# Patient Record
Sex: MALE | Race: WHITE | NOT HISPANIC OR LATINO | Employment: FULL TIME | URBAN - METROPOLITAN AREA
[De-identification: names, ages, dates, MRNs, and addresses within clinical notes are randomized per-mention and may not be internally consistent; named-entity substitution may affect disease eponyms.]

---

## 2023-03-01 ENCOUNTER — LAB REQUISITION (OUTPATIENT)
Dept: LAB | Facility: CLINIC | Age: 63
End: 2023-03-01
Payer: COMMERCIAL

## 2023-03-01 ENCOUNTER — TRANSFERRED RECORDS (OUTPATIENT)
Dept: HEALTH INFORMATION MANAGEMENT | Facility: CLINIC | Age: 63
End: 2023-03-01

## 2023-03-01 DIAGNOSIS — R06.02 SHORTNESS OF BREATH: ICD-10-CM

## 2023-03-01 DIAGNOSIS — R17 UNSPECIFIED JAUNDICE: ICD-10-CM

## 2023-03-01 LAB
ALBUMIN SERPL BCG-MCNC: 4.3 G/DL (ref 3.5–5.2)
ALBUMIN SERPL BCG-MCNC: 4.3 G/DL (ref 3.5–5.2)
ALP SERPL-CCNC: 57 U/L (ref 40–129)
ALP SERPL-CCNC: 57 U/L (ref 40–129)
ALT SERPL W P-5'-P-CCNC: 54 U/L (ref 10–50)
ALT SERPL W P-5'-P-CCNC: 54 U/L (ref 10–50)
ANION GAP SERPL CALCULATED.3IONS-SCNC: 16 MMOL/L (ref 7–15)
AST SERPL W P-5'-P-CCNC: 96 U/L (ref 10–50)
AST SERPL W P-5'-P-CCNC: 96 U/L (ref 10–50)
BILIRUB DIRECT SERPL-MCNC: 3.23 MG/DL (ref 0–0.3)
BILIRUB SERPL-MCNC: 7 MG/DL
BILIRUB SERPL-MCNC: 7 MG/DL
BUN SERPL-MCNC: 22.1 MG/DL (ref 8–23)
CALCIUM SERPL-MCNC: 8.4 MG/DL (ref 8.8–10.2)
CHLORIDE SERPL-SCNC: 102 MMOL/L (ref 98–107)
CREAT SERPL-MCNC: 0.77 MG/DL (ref 0.67–1.17)
DACRYOCYTES BLD QL SMEAR: SLIGHT
DEPRECATED HCO3 PLAS-SCNC: 20 MMOL/L (ref 22–29)
ERYTHROCYTE [DISTWIDTH] IN BLOOD BY AUTOMATED COUNT: 21.4 % (ref 10–15)
GFR SERPL CREATININE-BSD FRML MDRD: >90 ML/MIN/1.73M2
GLUCOSE SERPL-MCNC: 182 MG/DL (ref 70–99)
HCT VFR BLD AUTO: 16.9 % (ref 40–53)
HGB BLD-MCNC: 5.8 G/DL (ref 13.3–17.7)
MCH RBC QN AUTO: 39.7 PG (ref 26.5–33)
MCHC RBC AUTO-ENTMCNC: 34.3 G/DL (ref 31.5–36.5)
MCV RBC AUTO: 116 FL (ref 78–100)
PLAT MORPH BLD: ABNORMAL
PLATELET # BLD AUTO: 75 10E3/UL (ref 150–450)
POLYCHROMASIA BLD QL SMEAR: SLIGHT
POTASSIUM SERPL-SCNC: 3.9 MMOL/L (ref 3.4–5.3)
PROT SERPL-MCNC: 6.5 G/DL (ref 6.4–8.3)
PROT SERPL-MCNC: 6.5 G/DL (ref 6.4–8.3)
RBC # BLD AUTO: 1.46 10E6/UL (ref 4.4–5.9)
RBC MORPH BLD: ABNORMAL
SODIUM SERPL-SCNC: 138 MMOL/L (ref 136–145)
WBC # BLD AUTO: 6.2 10E3/UL (ref 4–11)

## 2023-03-01 PROCEDURE — 82248 BILIRUBIN DIRECT: CPT | Mod: ORL | Performed by: FAMILY MEDICINE

## 2023-03-01 PROCEDURE — 80053 COMPREHEN METABOLIC PANEL: CPT | Mod: ORL | Performed by: FAMILY MEDICINE

## 2023-03-01 PROCEDURE — 85027 COMPLETE CBC AUTOMATED: CPT | Mod: ORL | Performed by: FAMILY MEDICINE

## 2023-03-02 ENCOUNTER — TRANSFERRED RECORDS (OUTPATIENT)
Dept: HEALTH INFORMATION MANAGEMENT | Facility: CLINIC | Age: 63
End: 2023-03-02

## 2023-03-03 ENCOUNTER — MEDICAL CORRESPONDENCE (OUTPATIENT)
Dept: HEALTH INFORMATION MANAGEMENT | Facility: CLINIC | Age: 63
End: 2023-03-03

## 2023-03-04 ENCOUNTER — TRANSFERRED RECORDS (OUTPATIENT)
Dept: HEALTH INFORMATION MANAGEMENT | Facility: CLINIC | Age: 63
End: 2023-03-04

## 2023-03-07 ENCOUNTER — LAB REQUISITION (OUTPATIENT)
Dept: LAB | Facility: CLINIC | Age: 63
End: 2023-03-07
Payer: COMMERCIAL

## 2023-03-07 DIAGNOSIS — D51.0 VITAMIN B12 DEFICIENCY ANEMIA DUE TO INTRINSIC FACTOR DEFICIENCY: ICD-10-CM

## 2023-03-07 LAB
ALBUMIN SERPL BCG-MCNC: 3.6 G/DL (ref 3.5–5.2)
ALP SERPL-CCNC: 59 U/L (ref 40–129)
ALT SERPL W P-5'-P-CCNC: 20 U/L (ref 10–50)
ANION GAP SERPL CALCULATED.3IONS-SCNC: 12 MMOL/L (ref 7–15)
AST SERPL W P-5'-P-CCNC: 16 U/L (ref 10–50)
BILIRUB SERPL-MCNC: 2.7 MG/DL
BUN SERPL-MCNC: 15.5 MG/DL (ref 8–23)
CALCIUM SERPL-MCNC: 8.2 MG/DL (ref 8.8–10.2)
CHLORIDE SERPL-SCNC: 103 MMOL/L (ref 98–107)
CREAT SERPL-MCNC: 0.66 MG/DL (ref 0.67–1.17)
DEPRECATED HCO3 PLAS-SCNC: 24 MMOL/L (ref 22–29)
GFR SERPL CREATININE-BSD FRML MDRD: >90 ML/MIN/1.73M2
GLUCOSE SERPL-MCNC: 95 MG/DL (ref 70–99)
PHOSPHATE SERPL-MCNC: 3.5 MG/DL (ref 2.5–4.5)
POTASSIUM SERPL-SCNC: 4 MMOL/L (ref 3.4–5.3)
PROT SERPL-MCNC: 5.8 G/DL (ref 6.4–8.3)
SODIUM SERPL-SCNC: 139 MMOL/L (ref 136–145)

## 2023-03-07 PROCEDURE — 83735 ASSAY OF MAGNESIUM: CPT | Mod: ORL | Performed by: FAMILY MEDICINE

## 2023-03-07 PROCEDURE — 80053 COMPREHEN METABOLIC PANEL: CPT | Mod: ORL | Performed by: FAMILY MEDICINE

## 2023-03-07 PROCEDURE — 84100 ASSAY OF PHOSPHORUS: CPT | Mod: ORL | Performed by: FAMILY MEDICINE

## 2023-03-08 LAB — MAGNESIUM SERPL-MCNC: 4.1 MG/DL (ref 1.7–2.3)

## 2023-03-14 ENCOUNTER — LAB REQUISITION (OUTPATIENT)
Dept: LAB | Facility: CLINIC | Age: 63
End: 2023-03-14
Payer: COMMERCIAL

## 2023-03-14 DIAGNOSIS — R79.89 OTHER SPECIFIED ABNORMAL FINDINGS OF BLOOD CHEMISTRY: ICD-10-CM

## 2023-03-14 LAB — MAGNESIUM SERPL-MCNC: 2.3 MG/DL (ref 1.7–2.3)

## 2023-03-14 PROCEDURE — 83735 ASSAY OF MAGNESIUM: CPT | Mod: ORL | Performed by: FAMILY MEDICINE

## 2023-03-19 ENCOUNTER — TRANSFERRED RECORDS (OUTPATIENT)
Dept: HEALTH INFORMATION MANAGEMENT | Facility: CLINIC | Age: 63
End: 2023-03-19

## 2023-03-21 ENCOUNTER — LAB REQUISITION (OUTPATIENT)
Dept: LAB | Facility: CLINIC | Age: 63
End: 2023-03-21
Payer: COMMERCIAL

## 2023-03-21 DIAGNOSIS — M79.89 OTHER SPECIFIED SOFT TISSUE DISORDERS: ICD-10-CM

## 2023-03-21 PROCEDURE — 83880 ASSAY OF NATRIURETIC PEPTIDE: CPT | Mod: ORL | Performed by: FAMILY MEDICINE

## 2023-03-22 LAB — NT-PROBNP SERPL-MCNC: 1023 PG/ML (ref 0–900)

## 2023-04-18 ENCOUNTER — LAB REQUISITION (OUTPATIENT)
Dept: LAB | Facility: CLINIC | Age: 63
End: 2023-04-18
Payer: COMMERCIAL

## 2023-04-18 DIAGNOSIS — E80.6 OTHER DISORDERS OF BILIRUBIN METABOLISM: ICD-10-CM

## 2023-04-18 LAB
ALBUMIN SERPL BCG-MCNC: 4.6 G/DL (ref 3.5–5.2)
ALP SERPL-CCNC: 63 U/L (ref 40–129)
ALT SERPL W P-5'-P-CCNC: 13 U/L (ref 10–50)
ANION GAP SERPL CALCULATED.3IONS-SCNC: 12 MMOL/L (ref 7–15)
AST SERPL W P-5'-P-CCNC: 15 U/L (ref 10–50)
BILIRUB SERPL-MCNC: 0.5 MG/DL
BUN SERPL-MCNC: 19.3 MG/DL (ref 8–23)
CALCIUM SERPL-MCNC: 9.3 MG/DL (ref 8.8–10.2)
CHLORIDE SERPL-SCNC: 103 MMOL/L (ref 98–107)
CREAT SERPL-MCNC: 0.96 MG/DL (ref 0.67–1.17)
DEPRECATED HCO3 PLAS-SCNC: 24 MMOL/L (ref 22–29)
GFR SERPL CREATININE-BSD FRML MDRD: 90 ML/MIN/1.73M2
GLUCOSE SERPL-MCNC: 74 MG/DL (ref 70–99)
POTASSIUM SERPL-SCNC: 4.2 MMOL/L (ref 3.4–5.3)
PROT SERPL-MCNC: 7.1 G/DL (ref 6.4–8.3)
SODIUM SERPL-SCNC: 139 MMOL/L (ref 136–145)

## 2023-04-18 PROCEDURE — 80053 COMPREHEN METABOLIC PANEL: CPT | Mod: ORL | Performed by: FAMILY MEDICINE

## 2023-05-22 ENCOUNTER — OFFICE VISIT (OUTPATIENT)
Dept: CARDIOLOGY | Facility: CLINIC | Age: 63
End: 2023-05-22
Payer: COMMERCIAL

## 2023-05-22 VITALS
WEIGHT: 200 LBS | HEIGHT: 73 IN | DIASTOLIC BLOOD PRESSURE: 80 MMHG | SYSTOLIC BLOOD PRESSURE: 134 MMHG | HEART RATE: 78 BPM | BODY MASS INDEX: 26.51 KG/M2 | RESPIRATION RATE: 16 BRPM

## 2023-05-22 DIAGNOSIS — R06.09 DYSPNEA ON EXERTION: ICD-10-CM

## 2023-05-22 DIAGNOSIS — I48.0 PAROXYSMAL ATRIAL FIBRILLATION (H): Primary | ICD-10-CM

## 2023-05-22 PROCEDURE — 93000 ELECTROCARDIOGRAM COMPLETE: CPT | Performed by: INTERNAL MEDICINE

## 2023-05-22 PROCEDURE — 99204 OFFICE O/P NEW MOD 45 MIN: CPT | Mod: 25 | Performed by: INTERNAL MEDICINE

## 2023-05-22 RX ORDER — METOPROLOL SUCCINATE 25 MG/1
25 TABLET, EXTENDED RELEASE ORAL DAILY
COMMUNITY
End: 2023-06-12 | Stop reason: DRUGHIGH

## 2023-05-22 RX ORDER — CYANOCOBALAMIN, ISOPROPYL ALCOHOL 1000MCG/ML
1000 KIT INJECTION WEEKLY
COMMUNITY

## 2023-05-22 NOTE — PROGRESS NOTES
HEART CARE ENCOUNTER CONSULTATON NOTE      [facility]  [phone]      Assessment/Recommendations   Assessment;  1.  Atrial fibrillation: Persistent, longstanding.  Unclear duration and patient does certainly seem asymptomatic.  We discussed rate versus rhythm control.  It seems he would likely elect for rate control however given this is his first diagnosis and he is young we will recommend further discussion in our A-fib clinic to determine course of action.  2.  Anemia: Severe unclear etiology and will need to review records further  3.  Possible sleep apnea  4.  Anticoagulation: [id number] score of 0 no need for anticoagulation unless we plan to proceed with cardioversion    Plan:  1.  Continue low-dose metoprolol  2.  Exercise stress echocardiogram  3.  We will obtain further records  4.  Follow-up in A-fib clinic to further discuss rhythm control versus rate control.  5.  Follow-up with me in a few months         History of Present Illness/Subjective    HPI: Sebastian Gage is a 63 year old male who I am seeing today for initial consultation for atrial fibrillation.  I do not have all of his records available to me at this time.  I did receive notes including an event monitor and an echocardiogram.  He sees Dr. Menchaca for his PCP and was hospitalized at [facility] in March.  He reports that the day after Christmas he suffered from severe food poisoning and since then really never recovered.  He had a terrible appetite and lost 30 pounds over a few months time.  He started noticing that when he was walking in February he was very short of breath and had extreme decrease in his exercise tolerance.  No complaints of chest pain.  He was seen by Dr. Menchaca his new PCP on March 1.  EKG personally reviewed done on 3/1/2023 demonstrated atrial fibrillation at 145 bpm with almost diffuse ST depressions.  He was found to have a hemoglobin of 5.8 and was sent to the hospital.  He went to  "[facility].  Platelets were also low at 75.  Hospitalization is not available to me at this time.  He reports he was seen by hematology, cardiology and GI and they did not find any etiology for his severe anemia.  He received 3 blood transfusions.  He denies any black stools or bloody stools.  He reports having endoscopy/colonoscopy was not done.  He was placed on B12 shots and states that his last hemoglobin was 12.5 although again labs were not available.  It appears that he was in atrial fibrillation throughout his hospitalization.  I can see a follow-up 30-day event monitor and this demonstrated 110 beat of NSVT otherwise he was in atrial fibrillation throughout the recording.  That was rate controlled with an average rate of 84.  Today he reports he feels quite well.  He denies any symptoms at all.  He reports that his energy level has returned and he feels quite well.  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  He does snore and feels that he may have sleep apnea.    Recent Echocardiogram Results: [facility] 3/2/2023  Atrial fibrillation throughout study.  Normal left ventricular size with normal thickness LVEF 65%, normal RV size and function.  Mild biatrial enlargement.  Mild MR, mild TR, trivial pericardial effusion         Physical Examination  Review of Systems   Vitals: /80 (BP Location: Right arm, Patient Position: Sitting, Cuff Size: Adult Regular)   Pulse 78   Resp 16   Ht 1.854 m (6' 1\")   Wt 90.7 kg (200 lb)   BMI 26.39 kg/m    BMI= Body mass index is 26.39 kg/m .  Wt Readings from Last 3 Encounters:   05/22/23 90.7 kg (200 lb)       General Appearance:   no distress, normal body habitus   ENT/Mouth: membranes moist, no oral lesions or bleeding gums.      EYES:  no scleral icterus, normal conjunctivae   Neck: no carotid bruits or thyromegaly   Chest/Lungs:   lungs are clear to auscultation   Cardiovascular:   " irregular. Normal first and second heart sounds with no murmurs  no edema bilaterally    Abdomen:  bowel sounds are present   Extremities: no cyanosis or clubbing   Skin: no xanthelasma, warm.    Neurologic: normal  bilateral, no tremors     Psychiatric: alert and oriented x3, calm        Please refer above for cardiac ROS details.        Medical History  Surgical History Family History Social History   Atrial fibrillation  Severe anemia  Thrombocytopenia No past surgical history on file.    Atrial fibrillation-family members on mom side.  Father had heart disease in his 60s.  Brother has Down syndrome had atrial fibrillation Social History     Socioeconomic History     Marital status:      Spouse name: Not on file     Number of children: Not on file     Years of education: Not on file     Highest education level: Not on file   Occupational History     Not on file   Tobacco Use     Smoking status: Former     Types: Cigarettes     Smokeless tobacco: Never   Vaping Use     Vaping status: Not on file   Substance and Sexual Activity     Alcohol use: Not on file     Drug use: Never     Sexual activity: Not on file   Other Topics Concern     Not on file   Social History Narrative     Not on file     Social Determinants of Health     Financial Resource Strain: Not on file   Food Insecurity: Not on file   Transportation Needs: Not on file   Physical Activity: Not on file   Stress: Not on file   Social Connections: Not on file   Intimate Partner Violence: Not on file   Housing Stability: Not on file           Medications  Allergies   Current Outpatient Medications   Medication Sig Dispense Refill     Cyanocobalamin (B-12 COMPLIANCE INJECTION) 1000 MCG/ML KIT Inject 1,000 mcg Subcutaneous once a week       metoprolol succinate ER (TOPROL XL) 25 MG 24 hr tablet Take 25 mg by mouth daily       No Known Allergies       Lab Results    Chemistry/lipid CBC Cardiac Enzymes/BNP/TSH/INR   No results for input(s): CHOL, HDL,  LDL, TRIG, CHOLHDLRATIO in the last 50915 hours.  No results for input(s): LDL in the last 69086 hours.  Recent Labs   Lab Test 04/18/23  1634      POTASSIUM 4.2   CHLORIDE 103   CO2 24   GLC 74   BUN 19.3   CR 0.96   GFRESTIMATED 90   FARZANA 9.3     Recent Labs   Lab Test 04/18/23  1634 03/07/23  1702 03/01/23  1539   CR 0.96 0.66* 0.77     No results for input(s): A1C in the last 12500 hours.       Recent Labs   Lab Test 03/01/23  1553   WBC 6.2   HGB 5.8*   HCT 16.9*   *   PLT 75*     Recent Labs   Lab Test 03/01/23  1553   HGB 5.8*    No results for input(s): TROPONINI in the last 06635 hours.  Recent Labs   Lab Test 03/21/23  1654   NTBNP 1,023*     No results for input(s): TSH in the last 53789 hours.  No results for input(s): INR in the last 60948 hours.     Idalia Jorge MD

## 2023-05-22 NOTE — PATIENT INSTRUCTIONS
Sebastian Elliottdio,     It was a pleasure to see you in the office today. My recommendations for you include:   1. Consider a smart watch or a [employer] device  2. I will review your records  3. Stress echocardiogram   4. Sleep study evaluation     Please do not hesitate to call the [facility] clinic with any questions or concerns at [phone].    Sincerely,     Idalia Jorge MD

## 2023-05-22 NOTE — LETTER
5/22/2023    Omer Menchaca MD  [address]  [facility] [address]    RE: Sebastian Gage       Dear Colleague,     I had the pleasure of seeing Sebastian Gage in the [facility].    HEART CARE ENCOUNTER CONSULTATON NOTE      [facility]  [phone]      Assessment/Recommendations   Assessment;  1.  Atrial fibrillation: Persistent, longstanding.  Unclear duration and patient does certainly seem asymptomatic.  We discussed rate versus rhythm control.  It seems he would likely elect for rate control however given this is his first diagnosis and he is young we will recommend further discussion in our A-fib clinic to determine course of action.  2.  Anemia: Severe unclear etiology and will need to review records further  3.  Possible sleep apnea  4.  Anticoagulation: [id number] score of 0 no need for anticoagulation unless we plan to proceed with cardioversion    Plan:  1.  Continue low-dose metoprolol  2.  Exercise stress echocardiogram  3.  We will obtain further records  4.  Follow-up in A-fib clinic to further discuss rhythm control versus rate control.  5.  Follow-up with me in a few months         History of Present Illness/Subjective    HPI: Sebastian Gage is a 63 year old male who I am seeing today for initial consultation for atrial fibrillation.  I do not have all of his records available to me at this time.  I did receive notes including an event monitor and an echocardiogram.  He sees Dr. Menchaca for his PCP and was hospitalized at [facility] in March.  He reports that the day after Christmas he suffered from severe food poisoning and since then really never recovered.  He had a terrible appetite and lost 30 pounds over a few months time.  He started noticing that when he was walking in February he was very short of breath and had extreme decrease in his exercise tolerance.  No complaints of chest pain.  He was seen by Dr. Menchaca his new PCP on  "March 1.  EKG personally reviewed done on 3/1/2023 demonstrated atrial fibrillation at 145 bpm with almost diffuse ST depressions.  He was found to have a hemoglobin of 5.8 and was sent to the hospital.  He went to [facility].  Platelets were also low at 75.  Hospitalization is not available to me at this time.  He reports he was seen by hematology, cardiology and GI and they did not find any etiology for his severe anemia.  He received 3 blood transfusions.  He denies any black stools or bloody stools.  He reports having endoscopy/colonoscopy was not done.  He was placed on B12 shots and states that his last hemoglobin was 12.5 although again labs were not available.  It appears that he was in atrial fibrillation throughout his hospitalization.  I can see a follow-up 30-day event monitor and this demonstrated 110 beat of NSVT otherwise he was in atrial fibrillation throughout the recording.  That was rate controlled with an average rate of 84.  Today he reports he feels quite well.  He denies any symptoms at all.  He reports that his energy level has returned and he feels quite well.  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  He does snore and feels that he may have sleep apnea.    Recent Echocardiogram Results: [facility] 3/2/2023  Atrial fibrillation throughout study.  Normal left ventricular size with normal thickness LVEF 65%, normal RV size and function.  Mild biatrial enlargement.  Mild MR, mild TR, trivial pericardial effusion         Physical Examination  Review of Systems   Vitals: /80 (BP Location: Right arm, Patient Position: Sitting, Cuff Size: Adult Regular)   Pulse 78   Resp 16   Ht 1.854 m (6' 1\")   Wt 90.7 kg (200 lb)   BMI 26.39 kg/m    BMI= Body mass index is 26.39 kg/m .  Wt Readings from Last 3 Encounters:   05/22/23 90.7 kg (200 lb)       General Appearance:   no distress, normal body habitus   ENT/Mouth: " membranes moist, no oral lesions or bleeding gums.      EYES:  no scleral icterus, normal conjunctivae   Neck: no carotid bruits or thyromegaly   Chest/Lungs:   lungs are clear to auscultation   Cardiovascular:   irregular. Normal first and second heart sounds with no murmurs  no edema bilaterally    Abdomen:  bowel sounds are present   Extremities: no cyanosis or clubbing   Skin: no xanthelasma, warm.    Neurologic: normal  bilateral, no tremors     Psychiatric: alert and oriented x3, calm        Please refer above for cardiac ROS details.        Medical History  Surgical History Family History Social History   Atrial fibrillation  Severe anemia  Thrombocytopenia No past surgical history on file.    Atrial fibrillation-family members on mom side.  Father had heart disease in his 60s.  Brother has Down syndrome had atrial fibrillation Social History     Socioeconomic History    Marital status:      Spouse name: Not on file    Number of children: Not on file    Years of education: Not on file    Highest education level: Not on file   Occupational History    Not on file   Tobacco Use    Smoking status: Former     Types: Cigarettes    Smokeless tobacco: Never   Vaping Use    Vaping status: Not on file   Substance and Sexual Activity    Alcohol use: Not on file    Drug use: Never    Sexual activity: Not on file   Other Topics Concern    Not on file   Social History Narrative    Not on file     Social Determinants of Health     Financial Resource Strain: Not on file   Food Insecurity: Not on file   Transportation Needs: Not on file   Physical Activity: Not on file   Stress: Not on file   Social Connections: Not on file   Intimate Partner Violence: Not on file   Housing Stability: Not on file           Medications  Allergies   Current Outpatient Medications   Medication Sig Dispense Refill    Cyanocobalamin (B-12 COMPLIANCE INJECTION) 1000 MCG/ML KIT Inject 1,000 mcg Subcutaneous once a week      metoprolol  succinate ER (TOPROL XL) 25 MG 24 hr tablet Take 25 mg by mouth daily       No Known Allergies       Lab Results    Chemistry/lipid CBC Cardiac Enzymes/BNP/TSH/INR   No results for input(s): CHOL, HDL, LDL, TRIG, CHOLHDLRATIO in the last 30121 hours.  No results for input(s): LDL in the last 82674 hours.  Recent Labs   Lab Test 04/18/23  1634      POTASSIUM 4.2   CHLORIDE 103   CO2 24   GLC 74   BUN 19.3   CR 0.96   GFRESTIMATED 90   FARZANA 9.3     Recent Labs   Lab Test 04/18/23  1634 03/07/23  1702 03/01/23  1539   CR 0.96 0.66* 0.77     No results for input(s): A1C in the last 04433 hours.       Recent Labs   Lab Test 03/01/23  1553   WBC 6.2   HGB 5.8*   HCT 16.9*   *   PLT 75*     Recent Labs   Lab Test 03/01/23  1553   HGB 5.8*    No results for input(s): TROPONINI in the last 50586 hours.  Recent Labs   Lab Test 03/21/23  1654   NTBNP 1,023*     No results for input(s): TSH in the last 53302 hours.  No results for input(s): INR in the last 99354 hours.     Idalia Jorge MD      Thank you for allowing me to participate in the care of your patient.      Sincerely,     Idalia Jorge MD     [facility]  cc:   No referring provider defined for this encounter.

## 2023-05-23 LAB
ATRIAL RATE - MUSE: NORMAL BPM
DIASTOLIC BLOOD PRESSURE - MUSE: NORMAL MMHG
INTERPRETATION ECG - MUSE: NORMAL
P AXIS - MUSE: NORMAL DEGREES
PR INTERVAL - MUSE: NORMAL MS
QRS DURATION - MUSE: 68 MS
QT - MUSE: 346 MS
QTC - MUSE: 394 MS
R AXIS - MUSE: 4 DEGREES
SYSTOLIC BLOOD PRESSURE - MUSE: NORMAL MMHG
T AXIS - MUSE: 23 DEGREES
VENTRICULAR RATE- MUSE: 78 BPM

## 2023-06-07 ENCOUNTER — TELEPHONE (OUTPATIENT)
Dept: CARDIOLOGY | Facility: CLINIC | Age: 63
End: 2023-06-07
Payer: COMMERCIAL

## 2023-06-07 NOTE — TELEPHONE ENCOUNTER
Phone call to patient - informed him that [facility] [place] is working on obtaining approval for 6-28-23 per last documented note by rep 6-6-23 @ [id number] - reassured him that [facility] team will notify Dr. Jorge if echo is not approved - patient verbalized understanding and offered no further questions / concerns at this time.  mg

## 2023-06-07 NOTE — TELEPHONE ENCOUNTER
Patient dropped off letter at  reception desk that he rec'd from [facility] requesting medical data / records for review to approve 6-28-23 echo - patient requested call back to address.

## 2023-06-11 PROBLEM — I48.19 PERSISTENT ATRIAL FIBRILLATION (H): Status: ACTIVE | Noted: 2023-06-11

## 2023-06-11 PROBLEM — R06.83 SNORING: Status: ACTIVE | Noted: 2023-06-11

## 2023-06-12 ENCOUNTER — OFFICE VISIT (OUTPATIENT)
Dept: CARDIOLOGY | Facility: CLINIC | Age: 63
End: 2023-06-12
Attending: INTERNAL MEDICINE
Payer: COMMERCIAL

## 2023-06-12 VITALS
WEIGHT: 201 LBS | DIASTOLIC BLOOD PRESSURE: 90 MMHG | SYSTOLIC BLOOD PRESSURE: 146 MMHG | RESPIRATION RATE: 16 BRPM | HEART RATE: 108 BPM | HEIGHT: 73 IN | BODY MASS INDEX: 26.64 KG/M2

## 2023-06-12 DIAGNOSIS — R06.09 DYSPNEA ON EXERTION: ICD-10-CM

## 2023-06-12 DIAGNOSIS — I48.19 PERSISTENT ATRIAL FIBRILLATION (H): Primary | ICD-10-CM

## 2023-06-12 DIAGNOSIS — I48.0 PAROXYSMAL ATRIAL FIBRILLATION (H): ICD-10-CM

## 2023-06-12 DIAGNOSIS — R06.83 SNORING: ICD-10-CM

## 2023-06-12 PROCEDURE — 99205 OFFICE O/P NEW HI 60 MIN: CPT | Performed by: NURSE PRACTITIONER

## 2023-06-12 RX ORDER — METOPROLOL SUCCINATE 50 MG/1
50 TABLET, EXTENDED RELEASE ORAL DAILY
Qty: 90 TABLET | Refills: 3 | Status: SHIPPED | OUTPATIENT
Start: 2023-06-12

## 2023-06-12 RX ORDER — KETOCONAZOLE 20 MG/ML
5 SHAMPOO TOPICAL DAILY PRN
COMMUNITY
Start: 2022-11-05 | End: 2023-06-12

## 2023-06-12 NOTE — LETTER
6/12/2023    Omer Menchaca MD  [address]  [facility] [address]    RE: Sebastian Gage       Dear Colleague,     I had the pleasure of seeing Sebastian Gage in the [facility].    Thank you, Dr. Jorge, for asking the [facility] team to see Mr. Sebastian Gage to evaluate persistent atrial fibrillation.    Assessment/Recommendations     Assessment/Plan:    Diagnoses and all orders for this visit:  Persistent atrial fibrillation (H)    we discussed the ongoing importance of lifestyle modification (maintaining a healthy weight, sleep apnea diagnosis and management, alcohol avoidance) as part of a long term strategy for atrial fibrillation management    Current rate control strategy: metoprolol succinate 25 mg daily. Heart rate uncontrolled today at 108.   Metoprolol succinate increased to 50 mg daily.  Goal is to keep his heart rates between 50 and 90 bpm.    We had a long discussion today about rate versus rhythm control, I did introduce him to cardioversion, he is not interested in cardioversion at this time as his symptoms are very minimal, he reports mild shortness of breath on exertion only when he takes stairs and very mild swelling which is currently being controlled using compression stockings.    We reviewed the pathophysiology of atrial fibrillation and management considerations including stroke risk and anticoagulation, rate control, cardioversion, antiarrhythmic drug therapy, and catheter ablation.  Antiarrhythmic drug options discussed.  We discussed atrial fibrillation ablation procedures, anticipated success rates, the potential need for re-do ablation vs addition of anti-arrhythmic drugs, procedural risks (including groin bleeding, tamponade, phrenic or esophageal injury, stroke, pulmonary vein stenosis) and recovery expectations.  Introduced PVI ablation to him today, symptoms are mild at this time so will hold off with consult at this time.    I have  asked him to fill out release of records on his way out of clinic today for [facility]    We did review his cardiac event monitor findings back in March which show persistent atrial fib and flutter, heart rates ranged from 70 to 110 bpm, he did have 1 run of nonsustained V. Tach, 7 beat run where his heart rate went up to 205 bpm    Echocardiogram reviewed with patient today as well, ejection fraction was 65%, he did have mild mitral valve and tricuspid valve regurgitation at that time with mild biatrial enlargement        Snoring    He is looking into getting a home sleep study performed sometime in July    [id number] score of 0  and not currently on DOAC.  Back in March, he did have low hemoglobin of 5.8, they never did find the source of his bleeding, he did have his hemoglobin checked 2 weeks ago which was 13.5, he is agreeable to start aspirin 81 mg daily today, he is having his hemoglobin rechecked tomorrow.  He will have a further discussion regarding DOAC therapy with his PCP and he will let me know what he decides after that discussion.     Follow up in 3 months with Nancy Guadalupe CNP for persistent Afib     History of Present Illness/Subjective     Sebastian Gage is a very pleasant 63 year old male who comes in today for EP initial consult for persistent atrial fibrillation.  Referred by general cardiology, Dr. Jorge.     Sebastian Gage is a 63 year old male who I am seeing today for initial consultation for atrial fibrillation.  I do not have all of his records available to me at this time.   He sees Dr. Menchaca for his PCP at [facility] and was hospitalized at [facility] in March.  He reports that the day after Christmas he suffered from severe food poisoning and since then really never recovered.  He had a terrible appetite and lost 30 pounds over a few months time.  He started noticing that when he was walking in February he was very short of breath and had extreme decrease in his exercise tolerance.  No  complaints of chest pain.  He was seen by Dr. Menchaca his new PCP on March 1.  EKG personally reviewed done on 3/1/2023 demonstrated atrial fibrillation at 145 bpm with almost diffuse ST depressions.  He was found to have a hemoglobin of 5.8 and was sent to the hospital.  He went to [facility].  Platelets were also low at 75.  Hospitalization is not available to me at this time.  He reports he was seen by hematology, cardiology and GI and they did not find any etiology for his severe anemia.  He received 3 blood transfusions.  He denies any black stools or bloody stools.  He reports having endoscopy/colonoscopy was not done.  He was placed on B12 shots and states that his last hemoglobin was 12.5 although again labs were not available.  It appears that he was in atrial fibrillation throughout his hospitalization.  I can see a follow-up 30-day event monitor and this demonstrated 205 bpm beat of NSVT otherwise he was in atrial fibrillation and atrial flutter throughout the recording, heart rates ranged between 70 and 110 bpm with his atrial for been flutter.    Today he presents to the [place] clinic for initial EP consult for his persistent atrial fibrillation.  He is not currently anticoagulated at this time.  Current [id number] score of 0.  Current symptoms include shortness of breath on exertion with stairs only and some mild swelling that he is controlling with compression stockings.  He is currently rate controlled on metoprolol succinate 25 mg today, however his heart rates are in the 100s today.  Blood pressures are mildly elevated, 140 systolic over 80s today.  He does not regularly check his blood pressures at home.  Typically consumes 2-4 alcoholic beverages per week, half calf 2 to 4 cups/day.  Water intake is moderate.  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  Hemoglobins have corrected since March, his last level was checked about 2 weeks ago and he  reports that his hemoglobin was 13.5, he is scheduled to have this rechecked tomorrow through his PCP clinic.  He is checking into having home sleep study performed sometime this summer in July due to his suspected YARIEL.  He has been discussing cardioversion with some others at his [facility] we have undergone this, he is currently not interested in proceeding with cardioversion at this time due to his minimal symptoms related to his atrial fibrillation.  He denies any recent neurological changes such as vision changes, speech disturbance or weakness on one side.  He has had no further bleeding episodes since his drop in hemoglobin back in March.  There has been a discussion about him undergoing a stress test sometime in June, he is waiting for insurance approval on this.   Currently today in clinic, he denies any palpitations, presyncope or syncope, shortness of breath at rest ,lightheadedness or dizziness, chest pain, orthopnea, PND or any ongoing lower extremity swelling.  Most recent echocardiogram showed ejection fraction of 65% with mild tricuspid and mitral valve regurgitation with mild biatrial enlargement.        Cardiographics (reviewed):    EKG 5.22.23 shows atrial fibrillation ventricular rate 78 QT/QTc: 346/394 ms          EKG 3.2.23 [facility] shows atrial fibrillation ventricular rate 91 QT/QTc: 350/430 ms       .  Recent Echocardiogram Results: [facility] 3/2/2023  Atrial fibrillation throughout study.  Normal left ventricular size with normal thickness LVEF 65%, normal RV size and function.  Mild biatrial enlargement.  Mild MR, mild TR, trivial pericardial effusion      Cardiac Event Monitor 3/2023                     Problem List:  Patient Active Problem List   Diagnosis    Persistent atrial fibrillation (H)    Snoring     Revi  e  Physical Examination Review of Systems   w fystems  There were no vitals taken for this visit.  There is no height or weight on file to calculate BMI.  Wt Readings from  Last 3 Encounters:   05/22/23 90.7 kg (200 lb)     General Appearance:   Alert, well-appearing and in no acute distress.   HEENT: Atraumatic, normocephalic.  No scleral icterus, normal conjunctivae; mucous membranes pink and moist.     Chest: Chest symmetric, spine straight.   Lungs:   Respirations unlabored: Lungs are clear to auscultation.   Cardiovascular:   Normal first and second heart sounds with no murmurs, rubs, or gallops.  Irregular, tachycardic.   Normal JVD, no edema.       Extremities: No cyanosis or clubbing   Musculoskeletal: Moves all extremities   Skin: Warm, dry, intact.    Neurologic: Mood and affect are appropriate, alert and oriented to person, place, time, and situation     ROS: 10 point ROS neg other than the symptoms noted above in the HPI.     Medical History  Surgical History Family History Social History     No past medical history on file. No past surgical history on file. No family history on file. History   Smoking Status    Former    Types: Cigarettes   Smokeless Tobacco    Never     Social History    Substance and Sexual Activity      Alcohol use: Not on file       Medications  Allergies     Current Outpatient Medications   Medication Sig Dispense Refill    Cyanocobalamin (B-12 COMPLIANCE INJECTION) 1000 MCG/ML KIT Inject 1,000 mcg Subcutaneous once a week      metoprolol succinate ER (TOPROL XL) 25 MG 24 hr tablet Take 25 mg by mouth daily        No Known Allergies   Medical, surgical, family, social history, and medications were all reviewed and updated as necessary.   Lab Results    Chemistry/lipid CBC Cardiac Enzymes/BNP/TSH/INR   No results for input(s): CHOL, HDL, LDL, TRIG, CHOLHDLRATIO in the last 74500 hours.  No results for input(s): LDL in the last 04213 hours.  Recent Labs   Lab Test 04/18/23  1634      POTASSIUM 4.2   CHLORIDE 103   CO2 24   GLC 74   BUN 19.3   CR 0.96   GFRESTIMATED 90   FARZANA 9.3     Recent Labs   Lab Test 04/18/23  1634 03/07/23  1702  03/01/23  1539   CR 0.96 0.66* 0.77     No results for input(s): A1C in the last 55979 hours.       Recent Labs   Lab Test 03/01/23  1553   WBC 6.2   HGB 5.8*   HCT 16.9*   *   PLT 75*     Recent Labs   Lab Test 03/01/23  1553   HGB 5.8*    No results for input(s): TROPONINI in the last 01557 hours.  Recent Labs   Lab Test 03/21/23  1654   NTBNP 1,023*     No results for input(s): TSH in the last 91662 hours.  No results for input(s): INR in the last 43459 hours.       Total Time- 70 minutes spent on date of encounter doing chart review, history and exam, documentation and further activities as noted above.  This note has been dictated using voice recognition software. Any grammatical, typographical, or context distortions are unintentional and inherent to the software.    Nancy Guadalupe CNP  [facility]  [phone]                           Thank you for allowing me to participate in the care of your patient.      Sincerely,     Nancy Guadalupe NP     [facility]  cc:   Idalia Jorge MD  [address]  [address]  [place] [address]

## 2023-06-12 NOTE — PROGRESS NOTES
Thank you, Dr. Jorge, for asking the [facility] team to see Mr. Sebastian Gage to evaluate persistent atrial fibrillation.    Assessment/Recommendations     Assessment/Plan:    Diagnoses and all orders for this visit:  Persistent atrial fibrillation (H)    we discussed the ongoing importance of lifestyle modification (maintaining a healthy weight, sleep apnea diagnosis and management, alcohol avoidance) as part of a long term strategy for atrial fibrillation management    Current rate control strategy: metoprolol succinate 25 mg daily. Heart rate uncontrolled today at 108.   Metoprolol succinate increased to 50 mg daily.  Goal is to keep his heart rates between 50 and 90 bpm.    We had a long discussion today about rate versus rhythm control, I did introduce him to cardioversion, he is not interested in cardioversion at this time as his symptoms are very minimal, he reports mild shortness of breath on exertion only when he takes stairs and very mild swelling which is currently being controlled using compression stockings.    We reviewed the pathophysiology of atrial fibrillation and management considerations including stroke risk and anticoagulation, rate control, cardioversion, antiarrhythmic drug therapy, and catheter ablation.  Antiarrhythmic drug options discussed.  We discussed atrial fibrillation ablation procedures, anticipated success rates, the potential need for re-do ablation vs addition of anti-arrhythmic drugs, procedural risks (including groin bleeding, tamponade, phrenic or esophageal injury, stroke, pulmonary vein stenosis) and recovery expectations.  Introduced PVI ablation to him today, symptoms are mild at this time so will hold off with consult at this time.    I have asked him to fill out release of records on his way out of clinic today for [facility]    We did review his cardiac event monitor findings back in March which show persistent atrial fib and flutter, heart rates ranged  from 70 to 110 bpm, he did have 1 run of nonsustained V. Tach, 7 beat run where his heart rate went up to 205 bpm    Echocardiogram reviewed with patient today as well, ejection fraction was 65%, he did have mild mitral valve and tricuspid valve regurgitation at that time with mild biatrial enlargement        Snoring    He is looking into getting a home sleep study performed sometime in July    [id number] score of 0  and not currently on DOAC.  Back in March, he did have low hemoglobin of 5.8, they never did find the source of his bleeding, he did have his hemoglobin checked 2 weeks ago which was 13.5, he is agreeable to start aspirin 81 mg daily today, he is having his hemoglobin rechecked tomorrow.  He will have a further discussion regarding DOAC therapy with his PCP and he will let me know what he decides after that discussion.     Follow up in 3 months with Nancy Guadalupe CNP for persistent Afib     History of Present Illness/Subjective     Sebastian Gage is a very pleasant 63 year old male who comes in today for EP initial consult for persistent atrial fibrillation.  Referred by general cardiology, Dr. Jorge.     Sebastian Gage is a 63 year old male who I am seeing today for initial consultation for atrial fibrillation.  I do not have all of his records available to me at this time.   He sees Dr. Menchaca for his PCP at [facility] and was hospitalized at [facility] in March.  He reports that the day after Christmas he suffered from severe food poisoning and since then really never recovered.  He had a terrible appetite and lost 30 pounds over a few months time.  He started noticing that when he was walking in February he was very short of breath and had extreme decrease in his exercise tolerance.  No complaints of chest pain.  He was seen by Dr. Menchaca his new PCP on March 1.  EKG personally reviewed done on 3/1/2023 demonstrated atrial fibrillation at 145 bpm with almost diffuse ST depressions.  He was  found to have a hemoglobin of 5.8 and was sent to the hospital.  He went to [facility].  Platelets were also low at 75.  Hospitalization is not available to me at this time.  He reports he was seen by hematology, cardiology and GI and they did not find any etiology for his severe anemia.  He received 3 blood transfusions.  He denies any black stools or bloody stools.  He reports having endoscopy/colonoscopy was not done.  He was placed on B12 shots and states that his last hemoglobin was 12.5 although again labs were not available.  It appears that he was in atrial fibrillation throughout his hospitalization.  I can see a follow-up 30-day event monitor and this demonstrated 205 bpm beat of NSVT otherwise he was in atrial fibrillation and atrial flutter throughout the recording, heart rates ranged between 70 and 110 bpm with his atrial for been flutter.    Today he presents to the [place] clinic for initial EP consult for his persistent atrial fibrillation.  He is not currently anticoagulated at this time.  Current [id number] score of 0.  Current symptoms include shortness of breath on exertion with stairs only and some mild swelling that he is controlling with compression stockings.  He is currently rate controlled on metoprolol succinate 25 mg today, however his heart rates are in the 100s today.  Blood pressures are mildly elevated, 140 systolic over 80s today.  He does not regularly check his blood pressures at home.  Typically consumes 2-4 alcoholic beverages per week, half calf 2 to 4 cups/day.  Water intake is moderate.  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  Hemoglobins have corrected since March, his last level was checked about 2 weeks ago and he reports that his hemoglobin was 13.5, he is scheduled to have this rechecked tomorrow through his PCP clinic.  He is checking into having home sleep study performed sometime this summer in July due to his suspected  YARIEL.  He has been discussing cardioversion with some others at his [facility] we have undergone this, he is currently not interested in proceeding with cardioversion at this time due to his minimal symptoms related to his atrial fibrillation.  He denies any recent neurological changes such as vision changes, speech disturbance or weakness on one side.  He has had no further bleeding episodes since his drop in hemoglobin back in March.  There has been a discussion about him undergoing a stress test sometime in June, he is waiting for insurance approval on this.   Currently today in clinic, he denies any palpitations, presyncope or syncope, shortness of breath at rest ,lightheadedness or dizziness, chest pain, orthopnea, PND or any ongoing lower extremity swelling.  Most recent echocardiogram showed ejection fraction of 65% with mild tricuspid and mitral valve regurgitation with mild biatrial enlargement.        Cardiographics (reviewed):    EKG 5.22.23 shows atrial fibrillation ventricular rate 78 QT/QTc: 346/394 ms          EKG 3.2.23 [facility] shows atrial fibrillation ventricular rate 91 QT/QTc: 350/430 ms       .  Recent Echocardiogram Results: [facility] 3/2/2023  Atrial fibrillation throughout study.  Normal left ventricular size with normal thickness LVEF 65%, normal RV size and function.  Mild biatrial enlargement.  Mild MR, mild TR, trivial pericardial effusion      Cardiac Event Monitor 3/2023                     Problem List:  Patient Active Problem List   Diagnosis     Persistent atrial fibrillation (H)     Snoring     Revi  e  Physical Examination Review of Systems   w fystems  There were no vitals taken for this visit.  There is no height or weight on file to calculate BMI.  Wt Readings from Last 3 Encounters:   05/22/23 90.7 kg (200 lb)     General Appearance:   Alert, well-appearing and in no acute distress.   HEENT: Atraumatic, normocephalic.  No scleral icterus, normal conjunctivae; mucous  membranes pink and moist.     Chest: Chest symmetric, spine straight.   Lungs:   Respirations unlabored: Lungs are clear to auscultation.   Cardiovascular:   Normal first and second heart sounds with no murmurs, rubs, or gallops.  Irregular, tachycardic.   Normal JVD, no edema.       Extremities: No cyanosis or clubbing   Musculoskeletal: Moves all extremities   Skin: Warm, dry, intact.    Neurologic: Mood and affect are appropriate, alert and oriented to person, place, time, and situation     ROS: 10 point ROS neg other than the symptoms noted above in the HPI.     Medical History  Surgical History Family History Social History     No past medical history on file. No past surgical history on file. No family history on file. History   Smoking Status     Former     Types: Cigarettes   Smokeless Tobacco     Never     Social History    Substance and Sexual Activity      Alcohol use: Not on file       Medications  Allergies     Current Outpatient Medications   Medication Sig Dispense Refill     Cyanocobalamin (B-12 COMPLIANCE INJECTION) 1000 MCG/ML KIT Inject 1,000 mcg Subcutaneous once a week       metoprolol succinate ER (TOPROL XL) 25 MG 24 hr tablet Take 25 mg by mouth daily        No Known Allergies   Medical, surgical, family, social history, and medications were all reviewed and updated as necessary.   Lab Results    Chemistry/lipid CBC Cardiac Enzymes/BNP/TSH/INR   No results for input(s): CHOL, HDL, LDL, TRIG, CHOLHDLRATIO in the last 64466 hours.  No results for input(s): LDL in the last 45873 hours.  Recent Labs   Lab Test 04/18/23  1634      POTASSIUM 4.2   CHLORIDE 103   CO2 24   GLC 74   BUN 19.3   CR 0.96   GFRESTIMATED 90   FARZANA 9.3     Recent Labs   Lab Test 04/18/23  1634 03/07/23  1702 03/01/23  1539   CR 0.96 0.66* 0.77     No results for input(s): A1C in the last 75547 hours.       Recent Labs   Lab Test 03/01/23  1553   WBC 6.2   HGB 5.8*   HCT 16.9*   *   PLT 75*     Recent Labs   Lab  Test 03/01/23  1553   HGB 5.8*    No results for input(s): TROPONINI in the last 52588 hours.  Recent Labs   Lab Test 03/21/23  1654   NTBNP 1,023*     No results for input(s): TSH in the last 85686 hours.  No results for input(s): INR in the last 01268 hours.       Total Time- 70 minutes spent on date of encounter doing chart review, history and exam, documentation and further activities as noted above.  This note has been dictated using voice recognition software. Any grammatical, typographical, or context distortions are unintentional and inherent to the software.    Nancy Guadalupe [facility]  [phone]

## 2023-06-12 NOTE — PATIENT INSTRUCTIONS
Sebastian Gage,    It was a pleasure to see you today at the [facility].     My recommendations after this visit include:    I increased your metoprolol succinate 50 mg daily for heart rate control, goal is to keep your heart rates between 50-  90   Continue to spot check your blood pressures at home  Start taking aspirin 81 mg daily for stroke prevention, we discussed Eliquis and Xarelto today as well  We discussed cardioversion and ablation today, think about these procedures and IF you become symptomatic with your Afib, these are options for you.   Think about getting sleep study done this summer for snoring  I will see you back in 3 months for Afib follow up      Nancy Guadalupe CNP  [facility], Electrophysiology  [phone]  EP nurses [phone]

## 2023-10-18 ENCOUNTER — LAB REQUISITION (OUTPATIENT)
Dept: LAB | Facility: CLINIC | Age: 63
End: 2023-10-18
Payer: COMMERCIAL

## 2023-10-18 DIAGNOSIS — Z13.1 ENCOUNTER FOR SCREENING FOR DIABETES MELLITUS: ICD-10-CM

## 2023-10-18 DIAGNOSIS — Z13.220 ENCOUNTER FOR SCREENING FOR LIPOID DISORDERS: ICD-10-CM

## 2023-10-18 LAB
ALBUMIN SERPL BCG-MCNC: 4.5 G/DL (ref 3.5–5.2)
ALP SERPL-CCNC: 64 U/L (ref 40–129)
ALT SERPL W P-5'-P-CCNC: 18 U/L (ref 0–70)
ANION GAP SERPL CALCULATED.3IONS-SCNC: 14 MMOL/L (ref 7–15)
AST SERPL W P-5'-P-CCNC: 21 U/L (ref 0–45)
BILIRUB SERPL-MCNC: 0.6 MG/DL
BUN SERPL-MCNC: 19.9 MG/DL (ref 8–23)
CALCIUM SERPL-MCNC: 9.4 MG/DL (ref 8.8–10.2)
CHLORIDE SERPL-SCNC: 99 MMOL/L (ref 98–107)
CHOLEST SERPL-MCNC: 171 MG/DL
CREAT SERPL-MCNC: 1.05 MG/DL (ref 0.67–1.17)
DEPRECATED HCO3 PLAS-SCNC: 23 MMOL/L (ref 22–29)
EGFRCR SERPLBLD CKD-EPI 2021: 80 ML/MIN/1.73M2
GLUCOSE SERPL-MCNC: 142 MG/DL (ref 70–99)
HDLC SERPL-MCNC: 38 MG/DL
LDLC SERPL CALC-MCNC: 106 MG/DL
NONHDLC SERPL-MCNC: 133 MG/DL
POTASSIUM SERPL-SCNC: 4.3 MMOL/L (ref 3.4–5.3)
PROT SERPL-MCNC: 7.6 G/DL (ref 6.4–8.3)
SODIUM SERPL-SCNC: 136 MMOL/L (ref 135–145)
TRIGL SERPL-MCNC: 136 MG/DL

## 2023-10-18 PROCEDURE — 80053 COMPREHEN METABOLIC PANEL: CPT | Mod: ORL | Performed by: FAMILY MEDICINE

## 2023-10-18 PROCEDURE — 80061 LIPID PANEL: CPT | Mod: ORL | Performed by: FAMILY MEDICINE

## 2023-10-22 ENCOUNTER — HEALTH MAINTENANCE LETTER (OUTPATIENT)
Age: 63
End: 2023-10-22

## 2024-04-17 ENCOUNTER — LAB REQUISITION (OUTPATIENT)
Dept: LAB | Facility: CLINIC | Age: 64
End: 2024-04-17
Payer: COMMERCIAL

## 2024-04-17 DIAGNOSIS — D51.0 VITAMIN B12 DEFICIENCY ANEMIA DUE TO INTRINSIC FACTOR DEFICIENCY: ICD-10-CM

## 2024-04-17 PROCEDURE — 82607 VITAMIN B-12: CPT | Mod: ORL | Performed by: FAMILY MEDICINE

## 2024-04-18 LAB — VIT B12 SERPL-MCNC: 1981 PG/ML (ref 232–1245)

## 2024-10-07 ENCOUNTER — LAB REQUISITION (OUTPATIENT)
Dept: LAB | Facility: CLINIC | Age: 64
End: 2024-10-07
Payer: COMMERCIAL

## 2024-10-07 DIAGNOSIS — D51.0 VITAMIN B12 DEFICIENCY ANEMIA DUE TO INTRINSIC FACTOR DEFICIENCY: ICD-10-CM

## 2024-10-07 PROCEDURE — 82607 VITAMIN B-12: CPT | Mod: ORL | Performed by: FAMILY MEDICINE

## 2024-10-08 LAB — VIT B12 SERPL-MCNC: 1948 PG/ML (ref 232–1245)

## 2024-12-15 ENCOUNTER — HEALTH MAINTENANCE LETTER (OUTPATIENT)
Age: 64
End: 2024-12-15